# Patient Record
(demographics unavailable — no encounter records)

---

## 2024-09-09 NOTE — EDPHYS
Physician Documentation                                                                           

 [facility]                                                                 

Name: Jennie Villareal                                                                               

Age: 71 yrs                                                                                       

Sex: Female                                                                                       

: 1952                                                                                   

MRN: [id number]                                                                                   

Arrival Date: 2024                                                                          

Time: 11:44                                                                                       

Account#: [id number]                                                                            

Bed 18                                                                                            

Private MD:                                                                                       

ED Physician Alonso Elise                                                                     

HPI:                                                                                              

                                                                                             

14:52 This 71 yrs old Female presents to ER via Wheelchair with complaints of Doesn't Feel    rt  

      Right, Abdominal Pain.                                                                      

14:52 Patient is currently on Cipro for a UTI. Patient has been sick about a week. In 1 visit rt  

      Mir when she was subsequently discharged, had 2 previous visits to [facility], second which she was admitted overnight given IV fluids, IV antibiotics,          

      subsequently worsened. Patient was at Dr. Singletary's office today for her ,           

      apparently did not look well, was hypotensive to the 70s and tachycardic. Patient was       

      sent to the ED for further evaluation. Symptoms are moderate in severity, no other          

      aggravating or alleviating factors..                                                        

                                                                                                  

Historical:                                                                                       

- Allergies:                                                                                      

11:57 Albuterol;                                                                              cm10

11:57 Doxycycline;                                                                            cm10

11:57 Amoxicillin;                                                                            cm10

11:57 Levaquin;                                                                               cm10

11:57 Sulfa (Sulfonamide Antibiotics);                                                        cm10

11:57 Latex, Natural Rubber;                                                                  cm10

11:57 Augmentin;                                                                              cm10

11:57 Ketorolac;                                                                              cm10

11:57 Nickel;                                                                                 cm10

- PMHx:                                                                                           

11:57 Hypertensive disorder; Osteoporosis; Osteoarthritis; Osteopenia; GERD; Diverticulosis;  cm10

      Falx Meningioma; Eczema; Psoriasis;                                                         

11:59 Piriformis Syndrome;                                                                    cm10

                                                                                                  

- Immunization history:: Adult Immunizations up to date.                                          

- Infectious Disease History:: Denies.                                                            

- Social history:: Smoking status: Patient denies any tobacco usage or history of.                

- Family history:: not pertinent.                                                                 

                                                                                                  

                                                                                                  

ROS:                                                                                              

14:52 Cardiovascular: Negative for chest pain, palpitations, and edema, Respiratory: Negative rt  

      for shortness of breath, cough, wheezing, and pleuritic chest pain, MS/Extremity:           

      Negative for injury and deformity, Skin: Negative for injury, rash, and discoloration,      

      Neuro: Negative for headache, weakness, numbness, tingling, and seizure,                    

14:52 Constitutional: Positive for fatigue, malaise,                                              

14:52 Abdomen/GI: Positive for abdominal pain, Negative for nausea and vomiting,                  

                                                                                                  

Exam:                                                                                             

14:52 ECG was reviewed by the Attending Physician.                                            rt  

                                                                                                  

Vital Signs:                                                                                      

11:45  / 59; Pulse 79; Resp 18; Pulse Ox 97% on R/A;                                    db  

11:55  / 74; Pulse 78; Resp 16; Temp 98.2; Pulse Ox 96% on R/A; Weight 67.13 kg; Height cm10

      5 ft. 5 in. ; Pain 6/10;                                                                    

12:30  / 68; Pulse 73; Resp 16; Pulse Ox 96% on R/A;                                    db  

13:45  / 78; Pulse 74; Resp 16; Pulse Ox 98% on R/A;                                    db  

14:30  / 73; Pulse 76; Resp 16; Pulse Ox 98% on R/A;                                    db  

15:15  / 78; Pulse 76; Resp 16; Pulse Ox 97% on R/A;                                    db  

16:35  / 79; Pulse 79; Resp 16; Pulse Ox 99% on R/A;                                    db  

17:00  / 68; Pulse 80; Resp 16; Pulse Ox 99% on R/A;                                    db  

11:55 Body Mass Index 24.63 (67.13 kg, 165.1 cm)                                              cm10

11:55 Pain Scale: Adult                                                                       cm10

                                                                                                  

MDM:                                                                                              

11:47 Patient medically screened.                                                             rt  

14:52 Differential Diagnosis UTI, Juan. Data reviewed: vital signs, nurses notes, lab test    rt  

      result(s), EKG, radiologic studies. Consideration of Admission/Observation Patient was      

      admitted/placed on observation. Management of patient was discussed with the following:     

      Hospitalist: Agrees to admit. Independent interpretation of the following test(s) in        

      the Emergency Department CT Scan: My interpretation is No ureteral stone seen on my         

      interpretation of CT scan images. Care significantly affected by the following chronic      

      conditions: Hypertension. Counseling: I had a detailed discussion with the patient          

      and/or guardian regarding the historical points, exam findings, and any diagnostic          

      results supporting the discharge/admit diagnosis, lab results, radiology results, the       

      need for further work-up and treatment in the hospital. Response to treatment: the          

      patient's symptoms have markedly improved after treatment.                                  

                                                                                                  

                                                                                             

11:58 Order name: Blood Culture Adult (2)                                                     rt  

                                                                                             

11:58 Order name: CBC with Diff; Complete Time: 13:06                                         rt  

                                                                                             

11:58 Order name: CMP; Complete Time: 13:44                                                   rt  

                                                                                             

11:58 Order name: Lactate w/ 2H reflex if indic.; Complete Time: 13:44                        rt  

                                                                                             

11:58 Order name: Protime (+inr); Complete Time: 13:06                                        rt  

                                                                                             

11:58 Order name: Ptt, Activated; Complete Time: 13:06                                        rt  

                                                                                             

11:58 Order name: Urinalysis w/ reflexes; Complete Time: 13:06                                rt  

                                                                                             

11:58 Order name: Lipase; Complete Time: 13:44                                                rt  

                                                                                             

11:58 Order name: Troponin High Sensitivity; Complete Time: 13:44                             rt  

                                                                                             

12:54 Order name: Urine Culture                                                               EDMS

                                                                                             

16:21 Order name: Thyroid Stimulating Hormone                                                 EDMS

                                                                                             

16:21 Order name: CBC with Automated Diff                                                     EDMS

                                                                                             

16:21 Order name: CBC with Automated Diff                                                     EDMS

                                                                                             

16:21 Order name: Comprehensive Metabolic Panel                                               EDMS

                                                                                             

16:21 Order name: Comprehensive Metabolic Panel                                               EDMS

                                                                                             

16:21 Order name: Lipid Profile                                                               EDMS

                                                                                             

16:21 Order name: Lipid Profile                                                               EDMS

                                                                                             

16:21 Order name: Magnesium                                                                   EDMS

                                                                                             

16:21 Order name: Magnesium                                                                   EDMS

                                                                                             

16:21 Order name: Phosphorus                                                                  EDMS

                                                                                             

16:21 Order name: Phosphorus                                                                  EDMS

                                                                                             

11:58 Order name: Chest Single View XRAY; Complete Time: 13:44                                rt  

                                                                                             

11:58 Order name: CT Abd/Pelvis - IV Contrast Only; Complete Time: 13:44                      rt  

                                                                                             

11:58 Order name: EKG; Complete Time: 11:59                                                   rt  

                                                                                             

11:58 Order name: Accucheck; Complete Time: 14:11                                             rt  

                                                                                             

11:58 Order name: Cardiac monitoring; Complete Time: 12:43                                    rt  

                                                                                             

11:58 Order name: EKG - Nurse/Tech; Complete Time: 13:54                                      rt  

                                                                                             

11:58 Order name: IV Saline Lock - Large Bore; Complete Time: 12:43                           rt  

                                                                                             

11:58 Order name: Labs collected and sent; Complete Time: 12:43                               rt  

                                                                                             

11:58 Order name: O2 Per Protocol; Complete Time: 12:43                                       rt  

                                                                                             

11:58 Order name: O2 Sat Monitoring; Complete Time: 12:43                                     rt  

09                                                                                             

11:58 Order name: Vital Signs; Complete Time: 12:43                                           rt  

                                                                                                  

EC:52 Rate is 74 beats/min. Rhythm is regular, Normal Sinus Rhythm with No ectopy, Left       rt  

      bundle branch block. QRS Axis is Normal. SC interval is normal. QRS interval is normal.     

      QT interval is normal. No Q waves. No ST changes noted.                                     

                                                                                                  

Administered Medications:                                                                         

12:30 Drug: NS 0.9% IV 1000 ml IV at 1 bolus Per protocol; 1000 mL bolus Route: IV; Rate: 1   db  

      bolus; Site: right antecubital;                                                             

13:30 Follow up: Response: No adverse reaction; IV Status: Completed infusion; IV Intake:     kb3 

      1000ml                                                                                      

12:36 Drug: Cefepime IVPB 2 grams IVPB at 200 ml/hr once over 30 mins; (mix in  mL)     db  

      Route: IVPB; Rate: 200 ml/hr; Infused Over: 30 mins; Site: right antecubital;               

13:08 Follow up: Response: No adverse reaction; IV Status: Completed infusion; IV Intake:     db  

      100ml                                                                                       

                                                                                                  

                                                                                                  

Disposition Summary:                                                                              

24 14:58                                                                                    

Hospitalization Ordered                                                                           

 Notes:       Provider: Crhistiano Sandoval                                                              
  rt

      Condition: Fair                                                                         rt  

      Problem: new                                                                            rt  

      Symptoms: have improved                                                                 rt  

      Bed/Room Type: Standard                                                                 rt  

      Location: Telemetry/MedSurg (Inpatient)(24 16:34)                                 bd  

      Room Assignment: 214(24 16:34)                                                    bd  

      Diagnosis                                                                                   

        - Urinary tract infection                                                             rt  

        - Hypotension                                                                         rt  

      Forms:                                                                                      

        - Medication Reconciliation Form                                                      rt  

        - SBAR form                                                                           rt  

        - Leadership Thank You Letter                                                         rt  

Signatures:                                                                                       

Dispatcher [employer]                           Krysten Nix Danielle, RN                    RN   db                                                   

Alonso Elise MD MD   rt                                                   

Liat Ortiz RN                  RN   cm10                                                 

Charla Gallagher                        RN   kb3                                                  

                                                                                                  

Corrections: (The following items were deleted from the chart)                                    

12:00 11:57 PMHx: Piroformis Syndrome; cm10                                                   cm10

15:58 14:58 Telemetry/MedSurg (Inpatient) rt                                                  bd  

15:58 14:58 rt                                                                                bd  

16:34 15:58 [facility] ER HOLD bd                                                                   bd  

16:34 15:58 ERHOLD- bd                                                                        bd  

16:37 14:58 Inpatient Admission rt                                                            sb5 

                                                                                                  

**************************************************************************************************

## 2024-09-09 NOTE — P.HP
Certification for Inpatient


Patient admitted to: Inpatient


With expected LOS: >2 Midnights


Practitioner: I am a practitioner with admitting privileges, knowledge of 

patient current condition, hospital course, and medical plan of care.


Services: Services provided to patient in accordance with Admission requirements

found in Title 42 Section 412.3 of the Code of Federal Regulations





Patient History


Date of Service: 09/09/24


Reason for admission: ascending UTI, CUONG


History of Present Illness: 





Mrs. Villareal is a 71-year-old female with a past medical history of hypertension

with a left bundle branch block, allergic rhinitis, osteoporosis, gluten 

intolerance, and failed outpatient therapy for UTI.  She is allergic several 

classes of antibiotics.  She and her  state that she has been in four 

emergency departments in the last week for continuation of urinary tract 

infection symptoms with minimal treatment success.  Mrs. Villareal went to her 

's doctor's appointment today and she looked unwell.  Her vital signs 

were taken and she was noted to be hypotensive with a systolic blood pressure 

70.  She arrived in the emergency department and was evaluated, treated with 1 L

normal saline bolus, a urine culture was sent, and then she received Merrem 2 g 

IV piggyback.  She will be admitted for evaluation and treatment with 

antibiotics directed toward urine culture results.


Allergies





amoxicillin Allergy (Verified 05/05/22 08:52)


   Hives


clavulanic acid [From Augmentin] Allergy (Verified 05/05/22 08:52)


   Hives


doxycycline Allergy (Verified 05/05/22 08:52)


   Hives


latex Allergy (Verified 05/05/22 08:52)


   [place] Skin


levofloxacin [From Levaquin] Allergy (Verified 05/05/22 08:52)


   "Out of body experience"


nickel Allergy (Verified 05/05/22 08:52)


   Irritation that leads to sores


Sulfa (Sulfonamide Antibiotics) Allergy (Verified 05/05/22 08:52)


   Severe Headache


albuterol Adverse Reaction (Verified 05/05/22 08:52)


   Heart Racing


gluten Adverse Reaction (Verified 05/05/22 08:52)


   Nausea/Vomiting





Home Medications: 








Amitriptyline HCl 25 mg PO BEDTIME 05/05/22 


Bacillus Coagulans/Inulin [Probiotic 1 B Cfu-250 mg Cap] 1 each PO DAILY 

05/05/22 


Biotin 5,000 mcg PO DAILY 05/05/22 


Calcium Carb/Mag Ox/Zinc Sulf [Jhonatan-Mag-Zinc 334-134-5 mg Tab] 1 each PO BID 

05/05/22 


Cholecalciferol (Vitamin D3) [Vitamin D3] 2,000 unit PO DAILY 05/05/22 


Cranberry Conc/Ascorbic Acid [Cranberry Plus Vitamin C Sftgl] 1 each PO BID 

05/05/22 


D-Mannose [Azo D-Mannose] 1,000 mg PO BID 05/05/22 


Dexchlorpheniramin/Pseudoephed [Rescon Tablet] 1 each PO BID 05/05/22 


Estrogens,Conj Cream [Premarin 0.625MG/Gm] 1.5 gm VAG AS DIRECTED 05/05/22 


Folic Acid 1 mg PO BID 05/05/22 


Irbesartan 300 mg PO DAILY 05/05/22 


Ketoconazole/Skin Cleanser  28 [Ketodan 2% Foam Kit] 1 eliane TP DAILY 05/05/22 


Levothyroxine Sodium [Levothyroxine] 25 mcg PO DAILY 05/05/22 


Meloxicam [Mobic] 15 mg PO DAILY 05/05/22 


Metoprolol Tartrate [Lopressor] 50 mg PO BID 05/05/22 


Mirabegron [Myrbetriq] 25 mg PO EVERY 3RD DAY 05/05/22 


Mirtazapine 7.5 mg PO BEDTIME 05/05/22 


Montelukast [Singulair] 10 mg PO DAILY 05/05/22 


Omeprazole [Prilosec] 40 mg PO BID 05/05/22 


Prolia 1 dose SQ AS DIRECTED 05/05/22 








- Past Medical/Surgical History


Has patient received pneumonia vaccine in the past: Yes


-: Osteoporosis


-: Hypertension


-: Hypothyroidism


-: GERD


-: diverticulitis


-: Colectomy


-: Wrist surgery


-: D&C


-: Fundoplication


Psychosocial/ Personal History: Lives at home with her .  Gluten 

intolerant.  Multiple environmental and medicinal allergies.





- Family History


Family History: Reviewed- Non-Contributory





- Social History


Smoking Status: Unknown if ever smoked


Alcohol use: No


CD- Drugs: No


Caffeine use: Yes


Place of Residence: Home





Review of Systems


10-point ROS is otherwise unremarkable


General: Weakness, Malaise, As per HPI


Gastrointestinal: Nausea, Vomiting, Constipation


Genitourinary: Frequency, Urgency, As per HPI





Physical Examination





- Physical Exam


General: Alert, In no apparent distress, Oriented x3


HEENT: Atraumatic, Normocephalic


Neck: Supple


Respiratory: Normal air movement


Cardiovascular: No edema, Normal pulses, Regular rate/rhythm


Capillary refill: <2 Seconds


Gastrointestinal: Soft and benign


Musculoskeletal: No clubbing, No swelling


Integumentary: No rashes


Neurological: Normal speech, Normal tone, Normal affect


Lymphatics: No axilla or inguinal lymphadenopathy


External genitalia: Deferred


Rectal: Deferred





- Studies


Laboratory Data (last 24 hrs)











  09/09/24 09/09/24 09/09/24





  12:35 12:35 12:35


 


WBC    9.20


 


Hgb    11.9 L


 


Hct    36.4


 


Plt Count    406


 


PT  13.8 H  


 


INR  1.24  


 


APTT  29.1  


 


Sodium   133 L 


 


Potassium   4.3 


 


BUN   19 H 


 


Creatinine   1.21 H 


 


Glucose   117 H 


 


Total Bilirubin   0.4 


 


AST   24 


 


ALT   40 


 


Alkaline Phosphatase   118 H 


 


Lipase   54 











Assessment and Plan





- Plan


Ascending UTI with CUONG


Urine culture


Merrem 1 g every 12h pending culture results


Avoid nephrotoxins


Hold patient's meloxicam





Gluten intolerance/constipation


Gluten-free diet


Lactulose 10 mg p.o. twice daily


Lactinex p.o. 3 times daily


Carafate





Hyponatremia


0.45% normal saline at 75ml/hr





Hypertension with Left BBB


Monitor and trend


Carvedilol 12.5 mg p.o. twice daily with holding parameters


Hold Irbesartan for now





Hypothyroidism


TSH


Levothyroxine 0.1 mg p.o. daily





Allergic rhinitis


Flonase as directed





VTE/GI prophylaxis


Teds/Protonix





- Advance Directives


Does patient have a Living Will: No


Does patient have a Durable POA for Healthcare: No

## 2024-09-09 NOTE — RAD REPORT
EXAM DESCRIPTION:  Bonifacio Single View9/9/2024 1:02 pm

 

CLINICAL HISTORY:  Abdominal pain

 

COMPARISON:  none

 

FINDINGS:   The lungs appear clear of acute infiltrate. The heart is normal size

 

IMPRESSION:  No acute abnormalities displayed
EXAM DESCRIPTION:  CT - Abdomen   Pelvis W Contrast - 9/9/2024 1:21 pm

 

CLINICAL HISTORY:  Abdominal pain

 

COMPARISON:  none.

 

TECHNIQUE:  Computed axial tomography of the abdomen pelvis was obtained. 100 cc Isovue-300 was admin
istered intravenously. Oral contrast was not requested which limits evaluation of bowel and appendix

 

All CT scans are performed using dose optimization technique as appropriate and may include automated
 exposure control or mA/KV adjustment according to patient size.

 

FINDINGS:  The liver, spleen, pancreas, and adrenals appear unremarkable.

 

Enhancement and thickening of the wall of the renal pelves and ureters

 

There is no evidence of diverticulitis.

 

A normal appendix.

 

No adnexal mass. 1.5 centimeter right ovarian follicle.

 

Sigmoidectomy

 

IMPRESSION:  Enhancement and thickening of the wall of the renal pelves and ureters may indicate an a
scending UTI
Detail Level: Simple

## 2024-09-09 NOTE — ER
Nurse's Notes                                                                                     

 [facility]                                                                 

Name: Jennie Villareal                                                                               

Age: 71 yrs                                                                                       

Sex: Female                                                                                       

: 1952                                                                                   

MRN: [id number]                                                                                   

Arrival Date: 2024                                                                          

Time: 11:44                                                                                       

Account#: [id number]                                                                            

Bed 18                                                                                            

Private MD:                                                                                       

Diagnosis: Urinary tract infection;Hypotension                                                    

                                                                                                  

Presentation:                                                                                     

                                                                                             

11:55 Chief complaint: Patient states: Epigastric abdominal pain onset last week. PT was seen cm10

      at [facility] and diagnosed with a UTI last week. PT reports nausea. Sent to the       

      ED by Dr. Singletary. Pt also reports generalized body aches and generalized weakness.         

      Coronavirus screen: Client denies travel out of the [place] in the last 14 days. At this       

      time, the client does not indicate any symptoms associated with coronavirus-19. Ebola       

      Screen: Patient denies travel to an Ebola-affected area in the 21 days before illness       

      onset. No symptoms or risks identified at this time. Initial Sepsis Screen: Does the        

      patient meet any 2 criteria? No. Patient's initial sepsis screen is negative. Does the      

      patient have a suspected source of infection? No. Patient's initial sepsis screen is        

      negative. Risk Assessment: Do you want to hurt yourself or someone else? Patient            

      reports no desire to harm self or others. Onset of symptoms was 2024.         

11:55 Method Of Arrival: Wheelchair                                                           cm10

11:55 Acuity: ROSE MARIE 3                                                                           cm10

                                                                                                  

Triage Assessment:                                                                                

12:00 General: Appears in no apparent distress. comfortable, Behavior is calm, cooperative.   cm10

      Neuro: No deficits noted. Level of Consciousness is awake, alert, obeys commands,           

      Oriented to person, place, time, situation, Appropriate for age. Respiratory: No            

      deficits noted. Airway is patent Respiratory effort is even, unlabored, Respiratory         

      pattern is regular, symmetrical.                                                            

                                                                                                  

Historical:                                                                                       

- Allergies:                                                                                      

11:57 Albuterol;                                                                              cm10

11:57 Doxycycline;                                                                            cm10

11:57 Amoxicillin;                                                                            cm10

11:57 Levaquin;                                                                               cm10

11:57 Sulfa (Sulfonamide Antibiotics);                                                        cm10

11:57 Latex, Natural Rubber;                                                                  cm10

11:57 Augmentin;                                                                              cm10

11:57 Ketorolac;                                                                              cm10

11:57 Nickel;                                                                                 cm10

- PMHx:                                                                                           

11:57 Hypertensive disorder; Osteoporosis; Osteoarthritis; Osteopenia; GERD; Diverticulosis;  cm10

      Falx Meningioma; Eczema; Psoriasis;                                                         

11:59 Piriformis Syndrome;                                                                    cm10

                                                                                                  

- Immunization history:: Adult Immunizations up to date.                                          

- Infectious Disease History:: Denies.                                                            

- Social history:: Smoking status: Patient denies any tobacco usage or history of.                

- Family history:: not pertinent.                                                                 

                                                                                                  

                                                                                                  

Screenin:47 [facility] ED Fall Risk Assessment (Adult) History of falling in the last 3 months,       db  

      including since admission No falls in past 3 months (0 pts) Confusion or Disorientation     

      No (0 pts) Intoxicated or Sedated No (0 pts) Impaired Gait No (0 pts) Mobility Assist       

      Device Used No (0 pt) Altered Elimination No (0 pt) Score/Fall Risk Level 0 - 2 = Low       

      Risk Oriented to surroundings, Maintained a safe environment. Abuse screen: Denies          

      threats or abuse. Denies injuries from another. Nutritional screening: No deficits          

      noted. Tuberculosis screening: No symptoms or risk factors identified.                      

                                                                                                  

Assessment:                                                                                       

12:45 Reassessment: Patient appears in no apparent distress at this time. Patient and/or      db  

      family updated on plan of care and expected duration. Pain level reassessed. Patient is     

      alert, oriented x 3, equal unlabored respirations, skin warm/dry/pink. General: Appears     

      in no apparent distress. uncomfortable, Behavior is calm, cooperative. Pain: Denies         

      pain. Neuro: Level of Consciousness is awake, alert, obeys commands, Oriented to            

      person, place, time, situation. GI: Abdomen is flat, Bowel sounds present X 4 quads.        

      Abd is soft.                                                                                

13:30 Reassessment: Patient appears in no apparent distress at this time. Patient and/or      db  

      family updated on plan of care and expected duration. Pain level reassessed. Patient is     

      alert, oriented x 3, equal unlabored respirations, skin warm/dry/pink.                      

14:24 Reassessment: Patient appears in no apparent distress at this time. Patient and/or      db  

      family updated on plan of care and expected duration. Pain level reassessed. Patient is     

      alert, oriented x 3, equal unlabored respirations, skin warm/dry/pink. PATIENT              

      AMBULATORY TO RESTROOM.                                                                     

16:15 Reassessment: Patient appears in no apparent distress at this time. Patient and/or      db  

      family updated on plan of care and expected duration. Pain level reassessed. Patient is     

      alert, oriented x 3, equal unlabored respirations, skin warm/dry/pink. PATIENT              

      AMBULATORY TO RESTROOM Patient denies pain at this time.                                    

17:00 Reassessment: Patient appears in no apparent distress at this time. Patient and/or      db  

      family updated on plan of care and expected duration. Pain level reassessed. Patient is     

      alert, oriented x 3, equal unlabored respirations, skin warm/dry/pink. Patient states       

      feeling better. Patient states symptoms have improved.                                      

17:05 Reassessment: REPORT FAXED .                                                      bp  

                                                                                                  

Vital Signs:                                                                                      

11:45  / 59; Pulse 79; Resp 18; Pulse Ox 97% on R/A;                                    db  

11:55  / 74; Pulse 78; Resp 16; Temp 98.2; Pulse Ox 96% on R/A; Weight 67.13 kg; Height cm10

      5 ft. 5 in. ; Pain 6/10;                                                                    

12:30  / 68; Pulse 73; Resp 16; Pulse Ox 96% on R/A;                                    db  

13:45  / 78; Pulse 74; Resp 16; Pulse Ox 98% on R/A;                                    db  

14:30  / 73; Pulse 76; Resp 16; Pulse Ox 98% on R/A;                                    db  

15:15  / 78; Pulse 76; Resp 16; Pulse Ox 97% on R/A;                                    db  

16:35  / 79; Pulse 79; Resp 16; Pulse Ox 99% on R/A;                                    db  

17:00  / 68; Pulse 80; Resp 16; Pulse Ox 99% on R/A;                                    db  

11:55 Body Mass Index 24.63 (67.13 kg, 165.1 cm)                                              cm10

11:55 Pain Scale: Adult                                                                       cm10

                                                                                                  

ED Course:                                                                                        

11:46 Patient arrived in ED.                                                                  im  

11:47 Alonso Elise MD is Attending Physician.                                            rt  

11:57 Triage completed.                                                                       cm10

12:00 Jesica Hunt, GISSELL is Primary Nurse.                                                  db  

12:01 Arm band placed on Patient placed in an exam room, on a stretcher.                      cm10

12:29 First set of blood cultures drawn by me.                                                db  

12:30 Initial lab(s) drawn, by me, sent to lab. Inserted saline lock: 20 gauge in right       db  

      antecubital area, using aseptic technique. Blood collected. Flushed with 10 mL NS.          

12:35 Second set of blood cultures drawn by me.                                               db  

13:02 Chest Single View XRAY In Process Unspecified.                                          EDMS

13:23 CT Abd/Pelvis - IV Contrast Only In Process Unspecified.                                EDMS

14:58 Christiano Sandoval is Hospitalizing Provider.                                               rt  

15:57 CM met with  and her  Myek at the bedside. Patient identified by     ane 

      name and . Demographic sheet confirmed. Patient states she lives with  in a       

      single story home. She reports that prior to admission, she performs ADLs independently     

      and without physical limitation. MPOA is in place. NO HH, DME, home oxygen or other         

      medical services at this time. Patient's preferred plan is to return home upon              

      discharge and Myke states he will transport her home. CM team will continue to follow      

      and coordinate care.                                                                        

17:53 No provider procedures requiring assistance completed. Patient admitted, IV remains in  kb3 

      place.                                                                                      

17:54 Patient has correct armband on for positive identification. Placed in gown. Bed in low  kb3 

      position. Call light in reach. Provided Education on: Admission.                            

                                                                                                  

Administered Medications:                                                                         

12:30 Drug: NS 0.9% IV 1000 ml IV at 1 bolus Per protocol; 1000 mL bolus Route: IV; Rate: 1   db  

      bolus; Site: right antecubital;                                                             

13:30 Follow up: Response: No adverse reaction; IV Status: Completed infusion; IV Intake:     kb3 

      1000ml                                                                                      

12:36 Drug: Cefepime IVPB 2 grams IVPB at 200 ml/hr once over 30 mins; (mix in  mL)     db  

      Route: IVPB; Rate: 200 ml/hr; Infused Over: 30 mins; Site: right antecubital;               

13:08 Follow up: Response: No adverse reaction; IV Status: Completed infusion; IV Intake:     db  

      100ml                                                                                       

                                                                                                  

                                                                                                  

Medication:                                                                                       

17:00 VIS not applicable for this client.                                                     db  

                                                                                                  

Intake:                                                                                           

13:08 IV: 100ml; Total: 100ml.                                                                db  

13:30 IV: 1000ml; Total: 1100ml.                                                              kb3 

                                                                                                  

Outcome:                                                                                          

14:58 Decision to Hospitalize by Provider.                                                    rt  

17:53 Admitted to Med/surg accompanied by nurse,                                              [email or web address] 

17:53 Condition: stable                                                                           

17:53 Instructed on the need for admit, Demonstrated understanding of instructions,               

17:54 Patient left the ED.                                                                    kb3 

                                                                                                  

Signatures:                                                                                       

Dispatcher [employer]                           EDWyatt Chiu RN                      RN   bp                                                   

Charla Gallagher RN                    RN   kb3                                                  

eJsica HuntGISSELL RN, Ryan, MD MD rt Mendoza, Itzel im Martinez, Clarissa, RN RN   cm10                                                 

Negrita Marquez RN RN ane                                                  

                                                                                                  

Corrections: (The following items were deleted from the chart)                                    

12:00 11:57 PMHx: Piroformis Syndrome; cm10                                                   cm10

12: 11:55 Chief complaint: Patient states: Epigastric abdominal pain onset last week. PT    cm10

      was seen at [facility] and diagnosed with a UTI last week. PT reports nausea. Sent     

      to the ED by Dr. Singletary. cm10                                                              

                                                                                                  

**************************************************************************************************

## 2024-09-10 NOTE — EKG
Test Date:    2024-09-09               Test Time:    12:47:22

Technician:   DURGA                                     

                                                     

MEASUREMENT RESULTS:                                       

Intervals:                                           

Rate:         74                                     

VT:           156                                    

QRSD:         130                                    

QT:           450                                    

QTc:          499                                    

Axis:                                                

P:            42                                     

VT:           156                                    

QRS:          -14                                    

T:            141                                    

                                                     

INTERPRETIVE STATEMENTS:                                       

                                                     

Normal sinus rhythm

Left bundle branch block

Abnormal ECG

Compared to ECG 05/05/2022 07:52:04

Sinus arrhythmia no longer present



Electronically Signed On 09-10-24 16:53:15 CDT by Oleksandr Samayoa

## 2024-09-10 NOTE — P.PN
Date of Service: 09/10/24





Subjective:


reports still dealing with urinary frequency/urgency


nausea improving


denies any new/worsening issues 


afebrile 





ROS: 


10 point ROS as noted above, otherwise negative





Physical Exam:


GEN: Alert, oriented, NAD


CV: Regular rate and rhythm, no edema


Pulm: Nonlabored respirations on room air, clear bilaterally 


ABD: Soft, nontender, nondistended


Neuro: Normal speech, normal affect





vitals reviewed








Problem List:


UTI; recurrent / failed outpatient treatment


CUONG, mild; resolved 


Gluten intolerance/constipation


Hypertension


Hypothyroidism 


Allergic Rhinitis 


Hx left Bunde branch block 








UTI; recurrent 


On admission presents with weakness, fatigue, headache, nausea for the last 1-2 

weeks. 


   reports symptoms of headache, fatigue, nausea started ~1 day after starting 

montelukast.


   was seen on [place] [facility] ~1 week ago and [facility] twice in the last 

week 


   was dealing with nausea/vomiting at the time. LFTs were reportedly elevated 

at [place].  Treated with IV fluids, IV antibiotics and discharged with oral abx.




   Given 1 week prescription for Nitrofurantoin on 9/2, and 5 day script for 

cipro 9/5 per EMR. 


   Found to be hypotensive (70s systolic) and tachycardic in office prior and 

was advised to come to ER for further evaluation.


CT abdomen (9/9): concern for Ascending UTI. incidental 1.5 cm right ovarian 

follicle.


continue empiric merrem (9/9-)


   pt with multiple allergies


reports prior UTIs only causing symptoms of frequency


afebrile, no leukocytosis


follow urine and blood cultures  - likely may not grow given recent abx usage


ID consulted 





CUONG, mild; resolved 


suspect secondary to dehydration; improving with IVF overnight


continue to monitor renal function 





Gluten intolerance/constipation


gluten free diet 


continue lactulose, lactinex, carafate 





Hypertension


confirm home meds, restart as appropriate 


resume coreg 





Hypothyroidism 


continue home synthroid 





Allergic Rhinitis 


continue flonase 


 


Dispo: Home, ~1-2 days


Pending cultures, ID recs 





Time Spent Managing Pts Care (In Minutes): 41

## 2024-09-10 NOTE — CON
History Of Present Illness:  This is a 71-year-old female with significant past 
medical history of hypertension with left bundle branch block, allergic 
rhinitis, osteoporosis, gluten intolerance, coming in with failed outpatient 
therapy of urinary tract infection.  She is allergic to several antibiotics 
including Amoxil causing hives, doxycycline causing hives, latex causes skin 
bumps, Levaquin causes out-of-body experience, sulfa drugs causes severe 
headaches.  The patient has been in and out of the emergency room and this is 
her forth visit to emergency room in last 2 weeks.  Feels much better today.  
Her initial symptoms included nausea, vomiting, generalized body aches.



Past Medical History:  As per HPI.



Social History:  Nonsmoker, nondrinker.  Secondary exposure to smoke was there 
because of her family.



Family History:  Significant for different cancers.



Medications:  Include meropenem.  See MAR for other medications.



Allergies:  AUGMENTIN, DOXYCYCLINE, LEVAQUIN, SULFA DRUGS.  SEE ALLERGY PROFILE.



Review of Systems:

A 10-point review was performed.



Physical Examination:

General:  This is a 71-year-old female, lying in bed, not in any acute 
cardiopulmonary distress. 

Vital Signs:  Temperature 97, pulse 71, respirations 16, blood pressure 140/73. 

HEENT:  Unremarkable. 

Neck:  Supple. 

Lungs:  Basal crackles. 

Heart:  S1, S2.  Regular. 

Abdomen:  Soft, nontender.  Bowel sounds present. 

Extremities:  No edema.



Laboratory Data:  Shows WBC 7.2, hemoglobin 11.2, platelets 344.  Chemistry 
shows BUN of 15, creatinine 1, glucose 98, calcium 10.2, albumin level 2.5.  
Urinalysis shows wbc 10 to 20 with leukocyte esterase of 75, extremely turbid 
urine.  Micro data shows blood cultures are pending.  Urine culture shows mixed 
rosenda less than 10,000.



Assessment And Plan:  A 71-year-old female with urinary tract infection of 2 
weeks.  CT scan of abdomen and pelvis shows patient has enhancement of thickened
and thickening of the wall of the renal pelvis and ureters may indicate an 
ascending urinary tract infection.  We will recommend antibiotic for 2 weeks.  
Obtain cultures from her previous emergency visit at [facility].  Continue
current treatment. 



Thank you for consult.





KRYSTLE/EVELIO

DD:  09/10/2024 12:02:15   Voice ID:  [id number]

DT:  09/10/2024 17:01:03   Report ID:  [id number]

[facility] Division[facility]LOS

## 2024-09-11 NOTE — P.PN
Date of Service: 09/11/24





Urine culture from [facility] (9/2/24)














Subjective:


improving


mild nausea


mild urinary frequency


no new/worsening





ROS: 


10 point ROS as noted above, otherwise negative





Physical Exam:


GEN: Alert, oriented, NAD


CV: Regular rate and rhythm, no edema


Pulm: Nonlabored respirations on room air, clear bilaterally 


ABD: Soft, nontender, nondistended


Neuro: Normal speech, normal affect





vitals reviewed








Problem List:


UTI; recurrent / failed outpatient treatment


CUONG, mild; resolved 


Gluten intolerance/constipation


Hypertension


Hypothyroidism 


Allergic Rhinitis 


Hx left Bunde branch block 








UTI; recurrent / failed outpatient treatment


On admission presents with weakness, fatigue, headache, nausea for the last 1-2 

weeks


   reports symptoms of headache, fatigue, nausea started ~1 day after starting 

montelukast.


   was seen on [place] ER ~1 week ago and [facility] twice in the last 

week 


   was dealing with nausea/vomiting at the time. LFTs were reportedly elevated 

at [place].  Treated with IV fluids, IV antibiotics and discharged with oral abx.




   Given 1 week prescription for Nitrofurantoin on 9/2, and 5 day script for 

cipro 9/5 per EMR. 


   Found to be hypotensive (70s systolic) and tachycardic in office prior and 

was advised to come to ER for further evaluation.


CT abdomen (9/9): concern for Ascending UTI. incidental 1.5 cm right ovarian 

follicle.


prior UTIs only causing symptoms of frequency


Urine cx (9/9): Mixed rosenda


Blood cx (9/9): NGTD 


   Obtained results of urine culture from [place] which grew Pan-sensitive E. 

coli


      continue empiric merrem for now (9/9-)


      ID consulted; recommending 2 weeks of IV invanz (end date: 9/23/24)


      Midline ordered 9/11 for IV abx 


      afebrile, no leukocytosis


continue probiotic while on antibiotics 





CUONG, mild; resolved 


suspect secondary to dehydration; improved with IVF 


continue to monitor renal function 


dc IVF





Gluten intolerance/constipation


gluten free diet 


continue lactulose, lactinex, carafate 





Hypertension


confirm home meds, restart as appropriate 


resume coreg 





Hypothyroidism 


continue home synthroid 





Allergic Rhinitis 


continue flonase 


 


Dispo: Home, ~1 day 


Pending IV abx setup 





Time Spent Managing Pts Care (In Minutes): 41

## 2024-09-12 NOTE — P.DS
Admission Date: 09/09/24


Discharge Date: 09/13/24


Disposition: DC HOME/HOME HEALTH CARE


Reason for Admission: ascending UTI, CUONG


Consultations: 





ID - Dr. Shah 





Brief History of Present Illness: 





72yo F, PMH: hypertension with a left bundle branch block, allergic rhinitis, 

osteoporosis, gluten intolerance, 


Patient presented to ED with weakness, fatigue, headache, nausea for the last 1-

2 weeks. She and her  state that she has been in four emergency 

departments in the last week for continuation of urinary tract infection 

symptoms with minimal treatment success and failed outpatient therapy for UTI.  

She is allergic several classes of antibiotics.  Mrs. Villareal went to her 

's doctor's appointment today and she looked unwell.  Her vital signs 

were taken and she was noted to be hypotensive with a systolic blood pressure 

70.  She arrived in the emergency department and was evaluated, treated with 1 L

normal saline bolus, a urine culture was sent, and then she received Merrem 2 g 

IV piggyback.  She will be admitted for evaluation and treatment with 

antibiotics directed toward urine culture results.





Hospital Course: 








Problem List:


UTI; recurrent / failed outpatient treatment


CUONG, mild; resolved 


Gluten intolerance/constipation


Hypertension


Hypothyroidism 


Allergic Rhinitis 


Hx left Bunde branch block 





Physician discharge instructions:


Patient presented with weakness, fatigue, headache, nausea for the last 1-2 

weeks secondary to UTI. She reports being seen ~3 times in various ERs this past

week for similar symptoms. She was treated with IV fluids, IV antibiotics and 

discharged with oral antibiotics but symptoms persisted/worsened so she came 

here. CT abdomen this admission with findings consistent with ascending UTI. 


She was initially given IV merrem d/t multiple antibiotic allergies and had 

improvement of her symptoms. Urine and blood cultures were without growth here -

suspect due to recent abx usage prior to hospitalization.


Obtained results of urine culture from recent [place] visit on 9/2 which grew 

Pan-sensitive E. coli.


ID was consulted and recommended 2 weeks of IV invanz given minimal improvement 

on oral abx, persistent symptoms. Midline placed for IV antibiotics 9/12.


Patient was feeling better, afebrile without leukocytosis, headache/nausea 

improved, and was deemed stable for discharge. 





Patient clinically improved and was slated for discharge yesterday when she 

developed nausea/vomiting shortly after administration of invanz. 


Discussed with patient/family, she was monitored overnight and was administered 

Invanz again. No further episodes of nausea/vomiting reported. 


She was able to tolerate Invanz without issues on 9/13 and deemed stable for 

discharge. 








Medications:


IV invanz (end date: 9/23/24)


Mupirocin while receiving antibiotic


Can take over the counter probiotic while on antibiotics


Carafate as needed for acid reflux symptoms   


continue other home medications as previously prescribed








Follow up:


PCP 3-5 days


please call to schedule / confirm appointments 





Physical Exam:


GEN: Alert, oriented, NAD


CV: Regular rate and rhythm, no edema


Pulm: Nonlabored respirations on room air, clear bilaterally 


ABD: Soft, nontender, nondistended


Neuro: Normal speech, normal affect





Vital Signs/Physical Exam: 














Temp Pulse Resp BP Pulse Ox


 


 97.7 F   18 L  18   127/67   93 


 


 09/12/24 08:00  09/12/24 08:00  09/12/24 08:00  09/12/24 08:00  09/12/24 08:00








Laboratory Data at Discharge: 














WBC  6.50 thou/uL (4.3-10.9)   09/11/24  05:45    


 


Hgb  11.2 g/dL (12.0-15.0)  L  09/11/24  05:45    


 


Hct  32.7 % (36.0-45.0)  L  09/11/24  05:45    


 


Plt Count  388 thou/uL (152-406)   09/11/24  05:45    


 


PT  13.8 SECONDS (9.4-12.5)  H  09/09/24  12:35    


 


INR  1.24   09/09/24  12:35    


 


APTT  29.1 SECONDS (24.3-36.9)   09/09/24  12:35    


 


Sodium  135 mEq/L (136-145)  L  09/12/24  05:10    


 


Potassium  3.9 mEq/L (3.5-5.1)   09/12/24  05:10    


 


BUN  12 mg/dL (7-18)   09/12/24  05:10    


 


Creatinine  0.98 mg/dL (0.55-1.02)   09/12/24  05:10    


 


Glucose  101 mg/dL ()   09/12/24  05:10    


 


Phosphorus  3.2 mg/dL (2.5-4.9)   09/10/24  05:46    


 


Magnesium  2.1 mg/dL (1.6-2.4)   09/12/24  05:10    


 


Total Bilirubin  0.5 mg/dL (0.2-1.0)   09/10/24  05:46    


 


AST  21 U/L (15-37)   09/10/24  05:46    


 


ALT  35 U/L (13-56)   09/10/24  05:46    


 


Alkaline Phosphatase  101 U/L ()   09/10/24  05:46    


 


Triglycerides  101 mg/dL (<150)   09/10/24  05:46    


 


Cholesterol  159 mg/dL (<200)   09/10/24  05:46    


 


HDL Cholesterol  37 mg/dL (40-60)  L  09/10/24  05:46    


 


Cholesterol/HDL Ratio  4.30   09/10/24  05:46    


 


Lipase  54 U/L (13-75)   09/09/24  12:35    








Home Medications: 








Estrogens,Conj Cream [Premarin 0.625MG/Gm*] 1.5 gm VAG SEECOM 05/05/22 


Folic Acid 1 mg PO BID 05/05/22 


Levothyroxine Sodium 25 mcg PO DAILY 05/05/22 


Azilsartan Medoxomil [Edarbi] 1 tab PO DAILY 09/09/24 


Carvedilol [Coreg] 12.5 mg PO BID 09/09/24 


Mupirocin Calcium [Bactroban Nasal*] 1 appl ALYSSA BID 12 Days #1 tube 09/12/24 


Sucralfate [Carafate -Tab] 1 gm PO ACHS 15 Days #60 tab 09/13/24 





New Medications: 


Mupirocin Calcium [Bactroban Nasal*] 1 appl ALYSSA BID 12 Days #1 tube


Sucralfate [Carafate -Tab] 1 gm PO ACHS 15 Days #60 tab


Physician Discharge Instructions: 


Physician discharge instructions:


Patient presented with weakness, fatigue, headache, nausea for the last 1-2 

weeks secondary to UTI. She reports being seen ~3 times in various ERs this past

week for similar symptoms. She was treated with IV fluids, IV antibiotics and 

discharged with oral antibiotics but symptoms persisted/worsened so she came 

here. CT abdomen this admission with findings consistent with ascending UTI. 


She was initially given IV merrem d/t multiple antibiotic allergies and had 

improvement of her symptoms. Urine and blood cultures were without growth here -

suspect due to recent abx usage prior to hospitalization.


Obtained results of urine culture from recent [facility] visit on 9/2 which grew 

Pan-sensitive E. coli.


ID was consulted and recommended 2 weeks of IV invanz given minimal improvement 

on oral abx, persistent symptoms. Midline placed for IV antibiotics 9/12.


Patient was feeling better, afebrile without leukocytosis, headache/nausea 

improved, and was deemed stable for discharge. 





Patient clinically improved and was slated for discharge yesterday when she 

developed nausea/vomiting shortly after administration of invanz. 


Discussed with patient/family, she was monitored overnight and was administered 

Invanz again. No further episodes of nausea/vomiting reported. 


She was able to tolerate Invanz without issues on 9/13 and deemed stable for 

discharge. 








Medications:


IV invanz (end date: 9/23/24)


Mupirocin while receiving antibiotic


Can take over the counter probiotic while on antibiotics


Carafate as needed for acid reflux symptoms   


continue other home medications as previously prescribed








Follow up:


PCP 3-5 days


please call to schedule / confirm appointments 





Followup: 


Jeana Payton MD [Primary Care Provider] - 1-2 Weeks


Time spent managing pt's care (in minutes): 45

## 2024-09-12 NOTE — P.PN
Date of Service: 09/12/24





Subjective:


patient reported episode of nausea/vomiting after administration of invanz 


felt nauseous early in the morning prior to antibiotic, has h/o GERD/cartagena's


no rash / swelling


will monitor overnight. patient/family agreeable to try invanz again tomorrow 


otherwise no other issues 


afebrile 





ROS: 


10 point ROS as noted above, otherwise negative





Physical Exam:


GEN: Alert, oriented, NAD


CV: Regular rate and rhythm, no edema


Pulm: Nonlabored respirations on room air, clear bilaterally 


ABD: Soft, nontender, nondistended


Neuro: Normal speech, normal affect





vitals reviewed








Problem List:


UTI; recurrent / failed outpatient treatment


CUONG, mild; resolved 


Gluten intolerance/constipation


Hypertension


Hypothyroidism 


Allergic Rhinitis 


Hx left Bunde branch block


hx GERD / Cartagena's esophagus








UTI; recurrent / failed outpatient treatment


On admission presents with weakness, fatigue, headache, nausea for the last 1-2 

weeks


   reports symptoms of headache, fatigue, nausea started ~1 day after starting 

montelukast.


   was seen on [place] ER ~1 week ago and [facility] twice in the last 

week 


   was dealing with nausea/vomiting at the time. LFTs were reportedly elevated 

at [place].  Treated with IV fluids, IV antibiotics and discharged with oral abx.




   Given 1 week prescription for Nitrofurantoin on 9/2, and 5 day script for 

cipro 9/5 per EMR. 


   Found to be hypotensive (70s systolic) and tachycardic in office prior and 

was advised to come to ER for further evaluation.


CT abdomen (9/9): concern for Ascending UTI. incidental 1.5 cm right ovarian 

follicle.


prior UTIs only causing symptoms of frequency


Urine cx (9/9): Mixed rosenda


Blood cx (9/9): NGTD 


   Obtained results of urine culture from [place] which grew Pan-sensitive E. 

coli


      continue empiric merrem for now (9/9-)


      ID consulted; recommending 2 weeks of IV invanz (end date: 9/23/24)


      Midline placed for IV abx 


      afebrile, no leukocytosis


continue probiotic while on antibiotics 





Patient clinically improved and was slated for discharge when she developed 

nausea/vomiting shortly after administration of invanz. 


   likely reactive from Invanz


   will monitor overnight and try Invanz again tomorrow before attempting to 

swap abx 


   Patient/family in agreement with plan 


   PRN zofran





CUONG, mild; resolved 


suspect secondary to dehydration; improved with IVF 


continue to monitor renal function  





Gluten intolerance/constipation


gluten free diet 


continue lactulose, lactinex, carafate 





Hypertension  continue coreg 


Hypothyroidism continue home synthroid 


Allergic Rhinitis   continue flonase 


 


Dispo: Home, ~1 day 


Time Spent Managing Pts Care (In Minutes): 41

## 2024-09-12 NOTE — PN
Subjective:  The patient is lying in bed.  No new acute event.  Family by the bedside.



Objective:  Vital Signs:  Temperature 98, pulse 78, respirations 15, blood pressure __________. 

Lungs:  Basal crackles. 

Heart:  S1, S2.  Regular. 

Abdomen:  Soft, nontender.  Bowel sounds present. 

Extremities:  No edema.



Laboratory Data:  Shows WBC 6.5, hemoglobin 11.2, platelets 388.  Chemistry shows BUN 12, creatinine 
0.9.  Micro data; cultures are negative to date.  Urine growing more than 100,000 mixed rosenda.  CT sc
an showing ascending urinary tract infection.



Assessment/plan:  Ascending urinary tract infection.  Continue antibiotic for 2 weeks.  Can be switch
ed to Invanz to complete 2 weeks of treatment.  We will follow the patient as needed.





NF/MODL

DD:  09/11/2024 13:04:20Voice ID:  [id number]

DT:  09/11/2024 17:52:06Report ID:  [id number]

## 2024-09-13 NOTE — PN
Subjective:  The patient lying in bed.  No new acute events.   by the bedside.



Objective:  Vital Signs:  Temperature 97, pulse 73, respirations 16, blood pressure 85/46. 

Lungs:  Basal crackles. 

Heart:  S1, S2.  Regular. 

Abdomen:  Soft, nontender.  Bowel sounds present. 

Extremities:  No edema.



Laboratory Data:  Shows WBC 6.5, hemoglobin 11.2, platelets 388.  Chemistry shows BUN of 10, creatini
ne 0.9.  The patient is currently on Invanz, no reactions.



Assessment And Plan:  Urinary tract infection secondary to ESBL infection.  Continue antibiotic thera
py for total of 7 days.  We will follow the patient as needed.





NF/MODL

DD:  09/13/2024 11:59:34Voice ID:  [id number]

DT:  09/13/2024 13:03:46Report ID:  [id number]